# Patient Record
Sex: FEMALE | Race: WHITE | NOT HISPANIC OR LATINO
[De-identification: names, ages, dates, MRNs, and addresses within clinical notes are randomized per-mention and may not be internally consistent; named-entity substitution may affect disease eponyms.]

---

## 2019-03-08 ENCOUNTER — TRANSCRIPTION ENCOUNTER (OUTPATIENT)
Age: 19
End: 2019-03-08

## 2020-11-18 PROBLEM — Z00.00 ENCOUNTER FOR PREVENTIVE HEALTH EXAMINATION: Status: ACTIVE | Noted: 2020-11-18

## 2020-12-01 ENCOUNTER — FORM ENCOUNTER (OUTPATIENT)
Age: 20
End: 2020-12-01

## 2020-12-02 ENCOUNTER — APPOINTMENT (OUTPATIENT)
Dept: ELECTROPHYSIOLOGY | Facility: CLINIC | Age: 20
End: 2020-12-02
Payer: COMMERCIAL

## 2020-12-02 ENCOUNTER — TRANSCRIPTION ENCOUNTER (OUTPATIENT)
Age: 20
End: 2020-12-02

## 2020-12-02 ENCOUNTER — NON-APPOINTMENT (OUTPATIENT)
Age: 20
End: 2020-12-02

## 2020-12-02 VITALS
HEIGHT: 63 IN | OXYGEN SATURATION: 98 % | SYSTOLIC BLOOD PRESSURE: 126 MMHG | WEIGHT: 157 LBS | BODY MASS INDEX: 27.82 KG/M2 | DIASTOLIC BLOOD PRESSURE: 88 MMHG | HEART RATE: 108 BPM

## 2020-12-02 DIAGNOSIS — R00.0 TACHYCARDIA, UNSPECIFIED: ICD-10-CM

## 2020-12-02 DIAGNOSIS — R00.2 PALPITATIONS: ICD-10-CM

## 2020-12-02 DIAGNOSIS — G43.909 MIGRAINE, UNSPECIFIED, NOT INTRACTABLE, W/OUT STATUS MIGRAINOSUS: ICD-10-CM

## 2020-12-02 DIAGNOSIS — Z78.9 OTHER SPECIFIED HEALTH STATUS: ICD-10-CM

## 2020-12-02 DIAGNOSIS — Z83.3 FAMILY HISTORY OF DIABETES MELLITUS: ICD-10-CM

## 2020-12-02 DIAGNOSIS — R06.00 DYSPNEA, UNSPECIFIED: ICD-10-CM

## 2020-12-02 PROCEDURE — 0296T: CPT

## 2020-12-02 PROCEDURE — 93000 ELECTROCARDIOGRAM COMPLETE: CPT

## 2020-12-02 PROCEDURE — 99072 ADDL SUPL MATRL&STAF TM PHE: CPT

## 2020-12-02 PROCEDURE — 99204 OFFICE O/P NEW MOD 45 MIN: CPT

## 2020-12-02 RX ORDER — UBROGEPANT 100 MG/1
100 TABLET ORAL
Refills: 0 | Status: ACTIVE | COMMUNITY

## 2020-12-03 NOTE — PHYSICAL EXAM
[General Appearance - Well Developed] : well developed [General Appearance - Well Nourished] : well nourished [Normal Conjunctiva] : the conjunctiva exhibited no abnormalities [Normal Oropharynx] : normal oropharynx [Normal Jugular Venous V Waves Present] : normal jugular venous V waves present [Heart Sounds] : normal S1 and S2 [Respiration, Rhythm And Depth] : normal respiratory rhythm and effort [Abnormal Walk] : normal gait [Cyanosis, Localized] : no localized cyanosis [] : no rash [Bowel Sounds] : normal bowel sounds [Oriented To Time, Place, And Person] : oriented to person, place, and time

## 2020-12-04 NOTE — END OF VISIT
[FreeTextEntry3] : Agree with above NP note. 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 Pre-excitation pattern would suggest and rosita- or mid-septal, right sided pathway. Morphologically there is also the possibility of an atrio-fascicular pathway (Mahaim) though the inferiorly directed ventricular axis is inconsistent with this finding. We discussed the role of accessory pathways in both reentrant arrhythmias and their association with an increased (though small) risk of both atrial fibrillation and sudden death. Further we discussed EP study and possible ablation as a means of further risk stratification and management. The rationale for the procedure as well as its risks--including but not limited to bleeding, vascular injury, pericardial effusion/tamponade, heart block requiring pacemaker, stroke, and death--were reviewed with particular attention given to the risk of heart block given the likely proximity of this AP to her native AV conduction. After consideration of this information, the decision was made to proceed with the procedure. \par \par She is already scheduled for an ETT in your office; today she was given an event monitor for further clarification as to cause of her palpitations in anticipation of EP study.\par

## 2020-12-04 NOTE — HISTORY OF PRESENT ILLNESS
[FreeTextEntry1] : Referring Physician: Dr. Cordell Kc\par \par Dear Dr. Kc, \par \par Ms. Beal was seen in the [facility] today. For our records, please allow me to summarize the history and my findings.\par \par This pleasant 20 year old woman has a medical history for migraines, innocent heart murmur, and many years of mild chest tightness and palpitations. She was at her PCP office for a routine physical exam and had her first ECG, which reportedly revealed LBBB. She was then sent to your office and underwent EKG and ECHO. EKG confirmed the presence of an accessory pathway and ECHO revealed a normal structural heart with EF 55%. She presents to our office today for further evaluation. \par \par Today, she reports feeling generally well. She endorses mild sensation of palpations/chest tightness that can last minutes to hours and are relieved by lying in recumbent position. She will occasionally check her HR on an Apple watch and reports HR is usually 100-110bpm. For many years she attributed symptoms to anxiety. No identifiable triggers, symptoms occur both at rest and with activity. No exercise intolerance or limitations in daily activities. No lightheadedness, dizziness, shortness of breath, or syncope. \par  \par Her mother had palpations many years ago which resolved on their own. No family history of SCD, syncope or sudden loss of consciousness.  \par \par \par

## 2020-12-04 NOTE — ASSESSMENT
[FreeTextEntry1] : In summary, Ms. Beal is a 20 year old female with pre-excitation on EKG and symptoms of recurrent palpitations and chest tightness. We discussed diagnosis of Jes-Parkinson-White syndrome, including risks of SVT and SCD. We reviewed at length the risks and benefits of risk stratification with EPS and of ablation. The procedure, outcome, and risks of ablation were discussed in detail. The risks discussed included but not limited to bleeding, perforation, tamponade, heart block and MI.  After all questions answered the patient opted for EPS and ablation. \par \par She will wear a two week ziopatch monitor and was asked to trigger the monitor when feeling symptomatic to help better localize accessory pathway. We will review results of stress test from your office before she undergoes procedure. \par \par Thank you for allowing me to be involved in the care of this pleasant woman. Please feel free to contact me with any questions.  \par

## 2020-12-30 ENCOUNTER — NON-APPOINTMENT (OUTPATIENT)
Age: 20
End: 2020-12-30

## 2021-01-01 DIAGNOSIS — Z01.818 ENCOUNTER FOR OTHER PREPROCEDURAL EXAMINATION: ICD-10-CM

## 2021-01-02 ENCOUNTER — APPOINTMENT (OUTPATIENT)
Dept: DISASTER EMERGENCY | Facility: CLINIC | Age: 21
End: 2021-01-02

## 2021-01-03 LAB — SARS-COV-2 N GENE NPH QL NAA+PROBE: NOT DETECTED

## 2021-01-05 ENCOUNTER — OUTPATIENT (OUTPATIENT)
Dept: INPATIENT UNIT | Facility: HOSPITAL | Age: 21
LOS: 1 days | End: 2021-01-05
Payer: COMMERCIAL

## 2021-01-05 ENCOUNTER — TRANSCRIPTION ENCOUNTER (OUTPATIENT)
Age: 21
End: 2021-01-05

## 2021-01-05 VITALS
WEIGHT: 156.97 LBS | HEIGHT: 63 IN | OXYGEN SATURATION: 99 % | SYSTOLIC BLOOD PRESSURE: 132 MMHG | TEMPERATURE: 98 F | DIASTOLIC BLOOD PRESSURE: 74 MMHG | HEART RATE: 92 BPM | RESPIRATION RATE: 18 BRPM

## 2021-01-05 DIAGNOSIS — R00.0 TACHYCARDIA, UNSPECIFIED: ICD-10-CM

## 2021-01-05 LAB
ANION GAP SERPL CALC-SCNC: 13 MMOL/L — SIGNIFICANT CHANGE UP (ref 5–17)
APTT BLD: 30.9 SEC — SIGNIFICANT CHANGE UP (ref 27.5–35.5)
BLD GP AB SCN SERPL QL: NEGATIVE — SIGNIFICANT CHANGE UP
BUN SERPL-MCNC: 11 MG/DL — SIGNIFICANT CHANGE UP (ref 7–23)
CALCIUM SERPL-MCNC: 9.6 MG/DL — SIGNIFICANT CHANGE UP (ref 8.4–10.5)
CHLORIDE SERPL-SCNC: 103 MMOL/L — SIGNIFICANT CHANGE UP (ref 96–108)
CO2 SERPL-SCNC: 21 MMOL/L — LOW (ref 22–31)
CREAT SERPL-MCNC: 0.75 MG/DL — SIGNIFICANT CHANGE UP (ref 0.5–1.3)
GLUCOSE SERPL-MCNC: 94 MG/DL — SIGNIFICANT CHANGE UP (ref 70–99)
HCG SERPL-ACNC: <2 MIU/ML — SIGNIFICANT CHANGE UP
HCT VFR BLD CALC: 39.6 % — SIGNIFICANT CHANGE UP (ref 34.5–45)
HGB BLD-MCNC: 13.6 G/DL — SIGNIFICANT CHANGE UP (ref 11.5–15.5)
INR BLD: 0.96 RATIO — SIGNIFICANT CHANGE UP (ref 0.88–1.16)
MCHC RBC-ENTMCNC: 29.2 PG — SIGNIFICANT CHANGE UP (ref 27–34)
MCHC RBC-ENTMCNC: 34.3 GM/DL — SIGNIFICANT CHANGE UP (ref 32–36)
MCV RBC AUTO: 85 FL — SIGNIFICANT CHANGE UP (ref 80–100)
NRBC # BLD: 0 /100 WBCS — SIGNIFICANT CHANGE UP (ref 0–0)
PLATELET # BLD AUTO: 184 K/UL — SIGNIFICANT CHANGE UP (ref 150–400)
POTASSIUM SERPL-MCNC: 4.1 MMOL/L — SIGNIFICANT CHANGE UP (ref 3.5–5.3)
POTASSIUM SERPL-SCNC: 4.1 MMOL/L — SIGNIFICANT CHANGE UP (ref 3.5–5.3)
PROTHROM AB SERPL-ACNC: 11.5 SEC — SIGNIFICANT CHANGE UP (ref 10.6–13.6)
RBC # BLD: 4.66 M/UL — SIGNIFICANT CHANGE UP (ref 3.8–5.2)
RBC # FLD: 12 % — SIGNIFICANT CHANGE UP (ref 10.3–14.5)
RH IG SCN BLD-IMP: POSITIVE — SIGNIFICANT CHANGE UP
RH IG SCN BLD-IMP: POSITIVE — SIGNIFICANT CHANGE UP
SODIUM SERPL-SCNC: 137 MMOL/L — SIGNIFICANT CHANGE UP (ref 135–145)
WBC # BLD: 6.75 K/UL — SIGNIFICANT CHANGE UP (ref 3.8–10.5)
WBC # FLD AUTO: 6.75 K/UL — SIGNIFICANT CHANGE UP (ref 3.8–10.5)

## 2021-01-05 RX ORDER — ACETAMINOPHEN 500 MG
650 TABLET ORAL EVERY 6 HOURS
Refills: 0 | Status: DISCONTINUED | OUTPATIENT
Start: 2021-01-05 | End: 2021-01-06

## 2021-01-05 RX ORDER — FENTANYL CITRATE 50 UG/ML
25 INJECTION INTRAVENOUS ONCE
Refills: 0 | Status: DISCONTINUED | OUTPATIENT
Start: 2021-01-05 | End: 2021-01-05

## 2021-01-05 RX ORDER — ASPIRIN/CALCIUM CARB/MAGNESIUM 324 MG
81 TABLET ORAL DAILY
Refills: 0 | Status: DISCONTINUED | OUTPATIENT
Start: 2021-01-05 | End: 2021-01-06

## 2021-01-05 RX ORDER — OXYCODONE AND ACETAMINOPHEN 5; 325 MG/1; MG/1
1 TABLET ORAL EVERY 4 HOURS
Refills: 0 | Status: DISCONTINUED | OUTPATIENT
Start: 2021-01-05 | End: 2021-01-06

## 2021-01-05 NOTE — CHART NOTE - NSCHARTNOTEFT_GEN_A_CORE
Pre-op Diagnosis:  WPW    Post-op Diagnosis:  same    Procedure:  EPS/Ablation of anteroseptal accessory pathway     Electrophysiologist:  Dr. Mcginnis    Assistant:  Dr. Aguilar    Anesthesia:  MAC and local anesthesia     Access:  - Rt. Femoral vein  - Rt. Femoral artery  - Lt. femoral vein    Description:  - The patient arrived to the electrophysiology laboratory in a fasting state.  Informed consent was obtained.  Continuous electrocardiographic and hemodynamic monitoring was initiated.  The patient was prepped and draped in standard fashion.   - Using modified Seldinger technique, 8, 7F sheaths were placed in the right femoral vein and 5F, 6F x2 catheters were placed in the left femoral vein. A decapolar catheter was advanced into the coronary sinus, and quadripolar catheters positioned in the High RA (HRA), RV apex, His bundle.   - Programmed stimulation was performed from the HRA, coronary sinus and right ventricle. The patient showed manifest pre-excitation at baseline, we could not discern a His potential on the His catheter. There was no retrograde conduction though the accessory pathway with pacing from the RV apex. Retrograde conduction was always decremental and concentric and most likely yared. Parahisian pacing attempted and failed to show any evidence of retrogradely conducting pathway as well. Pacing from the High RA resulted in maximal pre-excitation and the ERP of the accessory pathway was achieved with S1-S2 500-300. AVNERP was seen at 500/250 msec. When the ERP of the accessory pathway would be achieved we were able to see a clear His potential on the His catheter with long AH interval. There was also what seemed to be AH jump suggestive of dual AV yared physiology. We were unable to induce SVT with programmed stimulation or extra-stimuli. Isuprel was infused and we noticed the block CL of the accessory pathway to be about 240 ms which rendered this pathway to be a high risk pathway. Throughout the study the earliest activation was on CS 9,10. VA conduction was present and midline. VA ERP observed at 580/470 msec.   - Mapping catheter was ([employer]) was used to create a right atrial geometry and to define the AV annulus. Earliest ventricular signla (pre-Delta with 30-35 ms) was mapped to be in the anterosuperior region superior to the His cloud. Also Alexander potentials were seen at a site around 12 o'clock on the tricuspid annulus and Ablation lesions were delivered there with temporary suppression of the pathway. It was difficult to achieve good contact and deliver good lesions in that area despite multiple attempts. Then we decided to map and consider ablation from the NCC.  - Arterial access with US and Fluoroscopic guidance was achieved in the Rt. common femoral artery. 8 Fr. sheath was used. The Carto Smart Touch ablation catheter was advanced retrogradely and used to create an anatomical map of the LVOT. Signals recorded form the NCC with large A/Small V had what seems to be Alexander potentials similar to those observed from the tricuspid annulus. Ablation at this site resulted in termination of accessory pathway conduction within few seconds of initiating energy delivery. A second ablation lesion was applied in this area.   - After observation of 45 minutes there was no recurrence of accessory pathway conduction. Further attempts of atrial and ventricular pacing were made during the waiting period and no evidence of accessory pathway/ORT/AVNRT was seen.    - Arterial and Venous sheaths were pulled and manual pressure applied for 30 minutes till hemostasis achieved.  - The patient was transferred to the holding area in stable condition.      Complications:  none    EBL:  5 cc    Disposition:  CRS then back to [facility]    Plan:  - monitor b/l groins for bleeding/hematoma  - will start ASA 81 daily for 1 month   - call EP with any Qs.

## 2021-01-05 NOTE — CHART NOTE - NSCHARTNOTEFT_GEN_A_CORE
HPI:  20 year old female, COVID negative 1/3/20, no implantable devices with PMHx of migraines, several years of palpitations and WATT, presented to her PCP office for a routine physical exam along with an ECG which revealed a LBBB. She was then sent to see Dr. Mcginnis with EKG revealing WPW, pre-excitation pattern suggesting an rosita or mid-septal right sided pathway with a possibility of an atriofascicular pathway and echo revealing an EF of 55%. She is now here for a scheduled EP study for possible induction of arrythmia and possible SVT ablation. She currently feels palpitations and slight shortness of breath at rest, which she reports has been getting worse the last two weeks. She denies chest pain, dizziness, syncope, recent weight gain, N/V, sick contacts, fever or recent travel. (05 Jan 2021 07:23)      s/p EP study WPW ablation via RFV/RFA and LFV with no sutures in place, gauze and tegaderm in place with no bleeding, hematoma and site soft to palpation.   Bedrest until 2100   Post procedure ECG NSR @ 64  Admit to tele overnight

## 2021-01-05 NOTE — H&P CARDIOLOGY - HISTORY OF PRESENT ILLNESS
20 year old female, COVID negative 1/3/20, no implantable devices with PMHx of migraines, several years of palpitations and WATT, presented to her PCP office for a routine physical exam along with an ECG which revealed a LBBB. She was then sent to see Dr. Mcginnis with EKG revealing WPW, pre-excitation pattern suggesting an rosita or mid-septal right sided pathway with a possibility of an atriofascicular pathway and echo revealing an EF of 55%. She is now here for a scheduled EP study for possible induction of arrythmia and possible SVT ablation. She currently feels palpitations and slight shortness of breath at rest, which she reports has been getting worse the last two weeks. She denies chest pain, dizziness, syncope, recent weight gain, N/V, sick contacts, fever or recent travel.

## 2021-01-05 NOTE — CHART NOTE - NSCHARTNOTEFT_GEN_A_CORE
Patient bladder noted to be distended and tender to touch and patient c/o pain.     Straight catheterized patient with >1L output  called over to bedside by nurse for bleeding noted to right groin with small hematoma and c/o pain.   Pressure applied by MD Aguilar to right groin  Fentanyl 25mcg x1 given for pain Patient bladder noted to be distended and tender to touch and patient c/o pain.     Straight catheterized patient with >1L output  called over to bedside by nurse for bleeding noted to right groin with small hematoma and c/o pain, immediate pressure applied.   Pressure applied by MD Aguilar to right groin for 20 min with resolution of hematoma. Site soft, nontender to touch and no s/s of bleeding and hematoma, patient denies pain.

## 2021-01-06 VITALS
SYSTOLIC BLOOD PRESSURE: 135 MMHG | RESPIRATION RATE: 16 BRPM | HEART RATE: 112 BPM | DIASTOLIC BLOOD PRESSURE: 78 MMHG | TEMPERATURE: 99 F | OXYGEN SATURATION: 99 %

## 2021-01-06 LAB
ALBUMIN SERPL ELPH-MCNC: 3.7 G/DL — SIGNIFICANT CHANGE UP (ref 3.3–5)
ALP SERPL-CCNC: 58 U/L — SIGNIFICANT CHANGE UP (ref 40–120)
ALT FLD-CCNC: 13 U/L — SIGNIFICANT CHANGE UP (ref 10–45)
ANION GAP SERPL CALC-SCNC: 17 MMOL/L — SIGNIFICANT CHANGE UP (ref 5–17)
AST SERPL-CCNC: 24 U/L — SIGNIFICANT CHANGE UP (ref 10–40)
BILIRUB SERPL-MCNC: 0.2 MG/DL — SIGNIFICANT CHANGE UP (ref 0.2–1.2)
BUN SERPL-MCNC: 6 MG/DL — LOW (ref 7–23)
CALCIUM SERPL-MCNC: 8.9 MG/DL — SIGNIFICANT CHANGE UP (ref 8.4–10.5)
CHLORIDE SERPL-SCNC: 105 MMOL/L — SIGNIFICANT CHANGE UP (ref 96–108)
CO2 SERPL-SCNC: 16 MMOL/L — LOW (ref 22–31)
CREAT SERPL-MCNC: 0.63 MG/DL — SIGNIFICANT CHANGE UP (ref 0.5–1.3)
GLUCOSE SERPL-MCNC: 120 MG/DL — HIGH (ref 70–99)
HCT VFR BLD CALC: 36.6 % — SIGNIFICANT CHANGE UP (ref 34.5–45)
HGB BLD-MCNC: 12.2 G/DL — SIGNIFICANT CHANGE UP (ref 11.5–15.5)
MCHC RBC-ENTMCNC: 29.4 PG — SIGNIFICANT CHANGE UP (ref 27–34)
MCHC RBC-ENTMCNC: 33.3 GM/DL — SIGNIFICANT CHANGE UP (ref 32–36)
MCV RBC AUTO: 88.2 FL — SIGNIFICANT CHANGE UP (ref 80–100)
NRBC # BLD: 0 /100 WBCS — SIGNIFICANT CHANGE UP (ref 0–0)
PLATELET # BLD AUTO: 189 K/UL — SIGNIFICANT CHANGE UP (ref 150–400)
POTASSIUM SERPL-MCNC: 4 MMOL/L — SIGNIFICANT CHANGE UP (ref 3.5–5.3)
POTASSIUM SERPL-SCNC: 4 MMOL/L — SIGNIFICANT CHANGE UP (ref 3.5–5.3)
PROT SERPL-MCNC: 6 G/DL — SIGNIFICANT CHANGE UP (ref 6–8.3)
RBC # BLD: 4.15 M/UL — SIGNIFICANT CHANGE UP (ref 3.8–5.2)
RBC # FLD: 11.9 % — SIGNIFICANT CHANGE UP (ref 10.3–14.5)
SODIUM SERPL-SCNC: 138 MMOL/L — SIGNIFICANT CHANGE UP (ref 135–145)
WBC # BLD: 10.22 K/UL — SIGNIFICANT CHANGE UP (ref 3.8–10.5)
WBC # FLD AUTO: 10.22 K/UL — SIGNIFICANT CHANGE UP (ref 3.8–10.5)

## 2021-01-06 PROCEDURE — 85027 COMPLETE CBC AUTOMATED: CPT

## 2021-01-06 PROCEDURE — C1730: CPT

## 2021-01-06 PROCEDURE — 93010 ELECTROCARDIOGRAM REPORT: CPT

## 2021-01-06 PROCEDURE — 86901 BLOOD TYPING SEROLOGIC RH(D): CPT

## 2021-01-06 PROCEDURE — 85730 THROMBOPLASTIN TIME PARTIAL: CPT

## 2021-01-06 PROCEDURE — 93613 INTRACARDIAC EPHYS 3D MAPG: CPT

## 2021-01-06 PROCEDURE — 85610 PROTHROMBIN TIME: CPT

## 2021-01-06 PROCEDURE — 80048 BASIC METABOLIC PNL TOTAL CA: CPT

## 2021-01-06 PROCEDURE — 93005 ELECTROCARDIOGRAM TRACING: CPT

## 2021-01-06 PROCEDURE — 84702 CHORIONIC GONADOTROPIN TEST: CPT

## 2021-01-06 PROCEDURE — 80053 COMPREHEN METABOLIC PANEL: CPT

## 2021-01-06 PROCEDURE — 93653 COMPRE EP EVAL TX SVT: CPT

## 2021-01-06 PROCEDURE — C1732: CPT

## 2021-01-06 PROCEDURE — C1766: CPT

## 2021-01-06 PROCEDURE — 93623 PRGRMD STIMJ&PACG IV RX NFS: CPT

## 2021-01-06 PROCEDURE — 86850 RBC ANTIBODY SCREEN: CPT

## 2021-01-06 PROCEDURE — 86900 BLOOD TYPING SEROLOGIC ABO: CPT

## 2021-01-06 RX ORDER — ACETAMINOPHEN 500 MG
2 TABLET ORAL
Qty: 0 | Refills: 0 | DISCHARGE
Start: 2021-01-06

## 2021-01-06 RX ORDER — ACETAMINOPHEN 500 MG
650 TABLET ORAL EVERY 6 HOURS
Refills: 0 | Status: DISCONTINUED | OUTPATIENT
Start: 2021-01-06 | End: 2021-01-06

## 2021-01-06 RX ORDER — ASPIRIN/CALCIUM CARB/MAGNESIUM 324 MG
1 TABLET ORAL
Qty: 0 | Refills: 0 | DISCHARGE
Start: 2021-01-06

## 2021-01-06 RX ADMIN — Medication 81 MILLIGRAM(S): at 06:01

## 2021-01-06 RX ADMIN — Medication 650 MILLIGRAM(S): at 06:01

## 2021-01-06 NOTE — DISCHARGE NOTE PROVIDER - NSDCCPCAREPLAN_GEN_ALL_CORE_FT
PRINCIPAL DISCHARGE DIAGNOSIS  Diagnosis: Jes-Parkinson-White syndrome  Assessment and Plan of Treatment: Continue with your cardiologist and primary care MD. Continue your current medications. Call your physician for palpitations, feelings of rapid heart beat, lightheadedness, or dizziness. Monitor your left and right groin sites for swelling, bleeding.

## 2021-01-06 NOTE — DISCHARGE NOTE PROVIDER - NSDCCPTREATMENT_GEN_ALL_CORE_FT
PRINCIPAL PROCEDURE  Procedure: Electrophysiology study with ablation of focus of Jes-Parkinson-White syndrome  Findings and Treatment: WPW ablation

## 2021-01-06 NOTE — DISCHARGE NOTE PROVIDER - NSDCMRMEDTOKEN_GEN_ALL_CORE_FT
acetaminophen 325 mg oral tablet: 2 tab(s) orally every 6 hours, As needed, Mild Pain (1 - 3)  aspirin 81 mg oral delayed release tablet: 1 tab(s) orally once a day  Sprintec 0.25 mg-35 mcg oral tablet: 1 tab(s) orally once a day  Ubrelvy 100 mg oral tablet: 1 tab(s) orally once, As Needed   acetaminophen 325 mg oral tablet: 2 tab(s) orally every 6 hours, As needed, Mild Pain (1 - 3) Moderate (4 - 6)  acetaminophen 500 mg oral tablet: 2 tab(s) orally every 6 hours - Severe pain (8 - 10)  aspirin 81 mg oral delayed release tablet: 1 tab(s) orally once a day  Sprintec 0.25 mg-35 mcg oral tablet: 1 tab(s) orally once a day  Ubrelvy 100 mg oral tablet: 1 tab(s) orally once, As Needed

## 2021-01-06 NOTE — DISCHARGE NOTE PROVIDER - HOSPITAL COURSE
HPI:  20 year old female, COVID negative 1/3/20, no implantable devices with PMHx of migraines, several years of palpitations and WATT, presented to her PCP office for a routine physical exam along with an ECG which revealed a LBBB. She was then sent to see Dr. Mcginnis with EKG revealing WPW, pre-excitation pattern suggesting an rosita or mid-septal right sided pathway with a possibility of an atriofascicular pathway and echo revealing an EF of 55%. She is now here for a scheduled EP study for possible induction of arrythmia and possible SVT ablation. She currently feels palpitations and slight shortness of breath at rest, which she reports has been getting worse the last two weeks. She denies chest pain, dizziness, syncope, recent weight gain, N/V, sick contacts, fever or recent travel. (05 Jan 2021 07:23).    1/5 s/p WPW ablation. B/L groin sites without swelling, bleeding.

## 2021-01-06 NOTE — DISCHARGE NOTE PROVIDER - CARE PROVIDER_API CALL
Prashant Mcginnis  CARDIAC ELECTROPHYSIOLOGY  [address] [place], [place] [address]  Phone: [phone]  Fax: ()-  Follow Up Time:    Prashant Mcginnis  CARDIAC ELECTROPHYSIOLOGY  [address] [place], [place] [address]  Phone: [phone]  Fax: ()-  Scheduled Appointment: 02/17/2021 08:20 AM

## 2021-01-06 NOTE — CHART NOTE - NSCHARTNOTEFT_GEN_A_CORE
HPI:  20 year old female, COVID negative 1/3/20, no implantable devices with PMHx of migraines, several years of palpitations and WATT, presented to her PCP office for a routine physical exam along with an ECG which revealed a LBBB. She was then sent to see Dr. Mcginnis with EKG revealing WPW, pre-excitation pattern suggesting an rosita or mid-septal right sided pathway with a possibility of an atriofascicular pathway and echo revealing an EF of 55%. She is now here for a scheduled EP study for possible induction of arrythmia and possible SVT ablation. She currently feels palpitations and slight shortness of breath at rest, which she reports has been getting worse the last two weeks. She denies chest pain, dizziness, syncope, recent weight gain, N/V, sick contacts, fever or recent travel. (05 Jan 2021 07:23)  1/5- Patient s/p WPW ablation    Right groin site stable, ecchymosis, slightly tender upon palpation, palpable pulses  Ambulated without signs of hematoma, bleeding, discomfort managed well with Tylenol  ECG and tele reviewed with Dr. Brantley EP fellow  ST on monitor, not experiencing any associated symptoms, resting comfortably  Discussed with EP team, patient stable for discharge home and reviewed discharge instructions, verbalized understanding HPI:  20 year old female, COVID negative 1/3/20, no implantable devices with PMHx of migraines, several years of palpitations and WATT, presented to her PCP office for a routine physical exam along with an ECG which revealed a LBBB. She was then sent to see Dr. Mcginnis with EKG revealing WPW, pre-excitation pattern suggesting an rosita or mid-septal right sided pathway with a possibility of an atriofascicular pathway and echo revealing an EF of 55%. She is now here for a scheduled EP study for possible induction of arrythmia and possible SVT ablation. She currently feels palpitations and slight shortness of breath at rest, which she reports has been getting worse the last two weeks. She denies chest pain, dizziness, syncope, recent weight gain, N/V, sick contacts, fever or recent travel. (05 Jan 2021 07:23)  1/5- Patient s/p WPW ablation    Right groin site stable, ecchymosis, slightly tender upon palpation, palpable pulses  Ambulated without signs of hematoma, bleeding, discomfort managed well with Tylenol  Patient voided this morning without issue s/p mancilla removal  ECG and tele reviewed with Dr. Brantley EP fellow  ST on monitor, not experiencing any associated symptoms, resting comfortably  Discussed with EP team, patient stable for discharge home and reviewed discharge instructions, verbalized understanding

## 2021-01-06 NOTE — DISCHARGE NOTE PROVIDER - NSDCFUADDINST_GEN_ALL_CORE_FT
WOUND CARE:  The day AFTER your procedure  - Remove the bandage at the site GENTLY, clean with mild soap and water, and pat dry; leave open to air  - You may shower   - DO NOT apply lotions, creams, ointments, powder, perfumes to your incision site  -Check your groin every day. A small amount of bruising or soreness is normal, a bump ( smaller than nickel) might be present, normal  - DO NOT SOAK your site for 1 week ( no baths, no pools, no tubs, etc..)    ACTIVITY:  Your procedure was done through your groin  for the next 5 DAYS:  - Limit climbing stairs, no strenuous activity, pushing , pulling, or straining   DO NOT LIFT anything 10 lbs or heavier   you may resume sexual activity in 7 days, unless instructed otherwise  mild palpitations are normal     Follow heart healthy diet recommended by your doctor, , if you smoke STOP SMOKING ( may call [phone] for center of tobacco control if you need assistance).  for the next 24 hours:   - stay at home and rest, do not drive or operate heavy machinery   do not drink alcoholic beverages   do not make important personal or business decisions     ***CALL YOUR DOCTOR ***  IF you have fever, chills, body aches, or severe pain, swelling, redness, heat, yellow drainage from your incision site  IF bleeding  or significant new swelling from your puncture site  IF you experience rapid heartbeat or palpitations that cause: lightheadedness, dizziness, or fainting spell.  If you experience difficulty swallowing, or pain with swallowing   IF unable to ge tin contact with your doctor, you may call the Cardiology Office at [facility] at [phone]

## 2021-01-06 NOTE — DISCHARGE NOTE PROVIDER - NSDCPNSUBOBJ_GEN_ALL_CORE
Patient is a 20y old  Female who presents with a chief complaint of WPW Syndrome (2021 01:33)          Allergies    No Known Allergies    Intolerances        Medications:  acetaminophen   Tablet .. 650 milliGRAM(s) Oral every 6 hours PRN  aspirin enteric coated 81 milliGRAM(s) Oral daily  oxycodone    5 mG/acetaminophen 325 mG 1 Tablet(s) Oral every 4 hours PRN      Vitals:  T(C): 36.7 (21 @ 19:00), Max: 36.9 (21 @ 06:58)  HR: 102 (21 @ 22:30) (68 - 102)  BP: 114/70 (21 @ 22:30) (91/50 - 132/74)  BP(mean): 93 (21 @ 07:23) (93 - 93)  RR: 17 (21 @ 22:30) (16 - 18)  SpO2: 100% (21 @ 22:30) (95% - 100%)  Wt(kg): --  Daily Height in cm: 160.02 (2021 08:13)    Daily Weight in k.2 (2021 07:23)  I&O's Summary    2021 07:01  -  2021 01:47  --------------------------------------------------------  IN: 0 mL / OUT: 1600 mL / NET: -1600 mL          Physical Exam:  Appearance: Normal  Eyes: PERRL, EOMI  HENT: Normal oral mus[place]a, NC/AT  Cardiovascular: S1S2, RRR, No M/R/G, no JVD, No Lower extremity edema  Procedural Access Site: No hematoma, Non-tender to palpation, 2+ pulse, No bruit, No Ecchymosis  Respiratory: Clear to auscultation bilaterally  Gastrointestinal: Soft, Non tender, Normal Bowel Sounds  Musculoskeletal: No clubbing, No joint deformity   Neurologic: Non-focal  Lymphatic: No lymphadenopathy  Psychiatry: AAOx3, Mood & affect appropriate  Skin: No rashes, No ecchymoses, No cyanosis    01-05    137  |  103  |  11  ----------------------------<  94  4.1   |  21<L>  |  0.75    Ca    9.6      2021 07:17      PT/INR - ( 2021 07:17 )   PT: 11.5 sec;   INR: 0.96 ratio         PTT - ( 2021 07:17 )  PTT:30.9 sec        Lipid panel   Hgb A1c                         13.6   6.75  )-----------( 184      ( 2021 07:17 )             39.6         ECG:  bpm    Electrophysiology: WPW ablation    Interpretation of Telemetry:

## 2021-01-06 NOTE — DISCHARGE NOTE PROVIDER - PROVIDER TOKENS
PROVIDER:[TOKEN:[[id number]]] PROVIDER:[TOKEN:[[id number]],SCHEDULEDAPPT:[02/17/2021],SCHEDULEDAPPTTIME:[08:20 AM]]

## 2021-01-07 PROCEDURE — 93248 EXT ECG>7D<15D REV&INTERPJ: CPT

## 2021-01-07 PROCEDURE — 99072 ADDL SUPL MATRL&STAF TM PHE: CPT

## 2021-01-07 RX ORDER — NORGESTIMATE AND ETHINYL ESTRADIOL 7DAYSX3 LO
1 KIT ORAL
Qty: 0 | Refills: 0 | DISCHARGE

## 2021-01-07 RX ORDER — ACETAMINOPHEN 500 MG
2 TABLET ORAL
Qty: 0 | Refills: 0 | DISCHARGE

## 2021-01-07 RX ORDER — UBROGEPANT 100 MG/1
1 TABLET ORAL
Qty: 0 | Refills: 0 | DISCHARGE

## 2021-02-16 NOTE — PHYSICAL EXAM
[General Appearance - Well Developed] : well developed [General Appearance - Well Nourished] : well nourished [Normal Conjunctiva] : the conjunctiva exhibited no abnormalities [Normal Oropharynx] : normal oropharynx [Normal Jugular Venous V Waves Present] : normal jugular venous V waves present [Respiration, Rhythm And Depth] : normal respiratory rhythm and effort [Heart Sounds] : normal S1 and S2 [Bowel Sounds] : normal bowel sounds [Abnormal Walk] : normal gait [Cyanosis, Localized] : no localized cyanosis [] : no rash [Oriented To Time, Place, And Person] : oriented to person, place, and time

## 2021-02-17 ENCOUNTER — NON-APPOINTMENT (OUTPATIENT)
Age: 21
End: 2021-02-17

## 2021-02-17 ENCOUNTER — APPOINTMENT (OUTPATIENT)
Dept: ELECTROPHYSIOLOGY | Facility: CLINIC | Age: 21
End: 2021-02-17
Payer: COMMERCIAL

## 2021-02-17 VITALS — HEART RATE: 74 BPM | SYSTOLIC BLOOD PRESSURE: 130 MMHG | DIASTOLIC BLOOD PRESSURE: 85 MMHG

## 2021-02-17 PROBLEM — G43.909 MIGRAINE, UNSPECIFIED, NOT INTRACTABLE, WITHOUT STATUS MIGRAINOSUS: Chronic | Status: ACTIVE | Noted: 2021-01-05

## 2021-02-17 PROBLEM — R00.2 PALPITATIONS: Chronic | Status: ACTIVE | Noted: 2021-01-05

## 2021-02-17 PROCEDURE — 99214 OFFICE O/P EST MOD 30 MIN: CPT

## 2021-02-17 PROCEDURE — 99072 ADDL SUPL MATRL&STAF TM PHE: CPT

## 2021-02-17 PROCEDURE — 93242 EXT ECG>48HR<7D RECORDING: CPT

## 2021-02-17 PROCEDURE — 93000 ELECTROCARDIOGRAM COMPLETE: CPT | Mod: 59

## 2021-02-17 RX ORDER — NORGESTIMATE AND ETHINYL ESTRADIOL 0.25-0.035
0.25-35 KIT ORAL
Refills: 0 | Status: ACTIVE | COMMUNITY

## 2021-02-17 NOTE — ASSESSMENT
[FreeTextEntry1] : In summary, Ms. 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 We are pleased to see she is doing well with resolution of her accessory pathway. Given her ongoing mild palpitations, she will be given a three-day event monitor for further evaluation and was advised to trigger the device when symptomatic. \par \par We will discuss results via telephone. She will follow up in your office moving forward. \par \par Thank you for allowing me to be involved in the care of this pleasant woman. Please feel free to contact me with any questions.  \par

## 2021-02-17 NOTE — HISTORY OF PRESENT ILLNESS
[FreeTextEntry1] : Referring Physician: Dr. Cordell Kc\par \par Dear Dr. Kc, \par \par Ms. Beal was seen in the [facility] today. For our records, please allow me to summarize the history and my findings.\par \par This pleasant 20 year old woman has a medical history for migraines, innocent heart murmur, and many years of mild chest tightness and palpitations. She was at her PCP office for a routine physical exam and had her first ECG, which reportedly revealed LBBB. She was then sent to your office and underwent EKG and ECHO. EKG confirmed the presence of WPW and ECHO revealed a normal structural heart with EF 55%. \par \par On 1/5/2021 she underwent a successful anteroseptal pathway ablation from non-coronary cusp and presents today for follow up. She endorses palpitations immediately post -operatively, which improved after  a few days, and has returned over the past two weeks. She has been experiencing palpitations for many years and reports now they are still present, but significantly more mild and not bothersome at all. She no longer feels her heart racing. No groin pain, fever, or trouble swallowing\par \par EKG today revealed NSR @85bpm without an accessory pathway. \par \par

## 2021-03-01 PROCEDURE — 93244 EXT ECG>48HR<7D REV&INTERPJ: CPT

## 2021-03-01 PROCEDURE — 99072 ADDL SUPL MATRL&STAF TM PHE: CPT

## 2021-06-11 ENCOUNTER — TRANSCRIPTION ENCOUNTER (OUTPATIENT)
Age: 21
End: 2021-06-11

## 2022-01-01 NOTE — PRE-ANESTHESIA EVALUATION ADULT - NSANTHOSAYNRD_GEN_A_CORE
(2) more than 100 beats/min
No. HERMINIA screening performed.  STOP BANG Legend: 0-2 = LOW Risk; 3-4 = INTERMEDIATE Risk; 5-8 = HIGH Risk

## 2023-10-28 ENCOUNTER — NON-APPOINTMENT (OUTPATIENT)
Age: 23
End: 2023-10-28

## 2025-04-29 NOTE — ASU DISCHARGE PLAN (ADULT/PEDIATRIC) - DISCHARGE PLAN IS COMPLETE AND GIVEN TO PATIENT
History of Present Illness:  Lawrence Grier is a/an 52 y.o. man who follows up for unprovoked pulmonary embolism diagnosed in 12/2023. He also has a history of prior pulmonary embolism in 2013, at which time he was diagnosed with heterozygous factor V Leiden mutation. He was anticoagulated with warfarin but had difficulty staying in the therapeutic range so was transitioned to fondaparinux. He was kept on anticoagulation for about a year.    In early December 2023 started noticing dyspnea while running. Sought care and found to have pulmonary embolism. Treated as an outpatient with apixaban. After I first saw him last we got a venous duplex ultrasound (about a month after PE dx), which showed chronic changes in the left posterior tibial vein.     He continues on anticoagulation with apixaban.    He denies bleeding including epistaxis, gingival bleeding, hemoptysis, hematemesis, hematochezia, melena, and hematuria.     Since pulmonary embolism diagnosis he was found to have elevated light changes; saw hematology. Had bone survey. Findings consistent with MGUS. Also dealing with neuropathy of uncertain etiology; following with neurology.      Medical History[1]  Surgical History[2]  Social History[3]  Family History[4]  Current Medications[5]    Physical Examination:  Blood pressure 113/73, pulse 63, weight 76.7 kg (169 lb), SpO2 97%.  No distress  No JVD or carotid bruits  Lungs clear bilaterally  Heart regular and without murmurs  Abdomen soft and non-tender  No leg swelling  Pulses intact      Pertinent Labs:    Pertinent Imaging:  CTA chest 12/20/2023  IMPRESSION:  1.  This exam is positive for the presence of acute pulmonary emboli.  There is no convincing evidence for right ventricular strain.      Venous duplex ultrasound 1/18/2024   CONCLUSIONS:     Right Lower Venous: No evidence of acute deep vein thrombus visualized in the right lower extremity.     Left Lower Venous: No evidence of acute deep vein  thrombus visualized in the left lower extremity. There are chronic changes visualized in the posterior tibial vein.    Diagnoses and all orders for this visit:  Pulmonary embolism, unspecified chronicity, unspecified pulmonary embolism type, unspecified whether acute cor pulmonale present (Multi) (Primary)  Continue indefinite anticoagulation     Follow up in 6 months.    Consuelo Bearden MD, MS           [1]   Past Medical History:  Diagnosis Date    Androgenic alopecia     Factor V Leiden (Multi)     Pulmonary embolus     2 separate unprovoked PE   [2]   Past Surgical History:  Procedure Laterality Date    LUMBAR EPIDURAL INJECTION     [3]   Social History  Tobacco Use    Smoking status: Never    Smokeless tobacco: Never   Vaping Use    Vaping status: Never Used   Substance Use Topics    Alcohol use: Yes     Alcohol/week: 7.0 standard drinks of alcohol     Types: 7 Cans of beer per week     Comment: 1 beer a day.    Drug use: Never   [4]   Family History  Problem Relation Name Age of Onset    Breast cancer Mother Darleen Grier     Atrial fibrillation Father      No Known Problems Sister      No Known Problems Sister      No Known Problems Daughter          Jr in College    No Known Problems Son      No Known Problems Son          Medical school - [employer]   [5]   Current Outpatient Medications   Medication Sig Dispense Refill    albuterol 90 mcg/actuation inhaler Inhale 2 puffs every 6 hours if needed for wheezing. 18 g 1    apixaban (Eliquis) 5 mg tablet Take 1 tablet (5 mg) by mouth 2 times a day. 180 tablet 3    b complex 0.4 mg tablet Take 1 tablet by mouth once daily.      diazePAM (Valium) 10 mg tablet Take 30 min prior to flight. 24 tablet 0    finasteride (Propecia) 1 mg tablet TAKE ONE TABLET BY MOUTH ONCE DAILY 90 tablet 3    minoxidiL-finasteride 7-0.1 % solution Apply 1 Application topically 2 times a day. 30 g 11    multivitamin tablet Take 1 tablet by mouth once daily.      oxymetazoline (Afrin) 0.05 %  : Yes nasal spray Administer 2 sprays into each nostril once daily at bedtime. Do not use for more than 3 days.      tadalafil (Cialis) 2.5 mg tablet Take 1 tablet (2.5 mg) by mouth once daily.      gabapentin (Neurontin) 100 mg capsule Take 1 capsule (100 mg) by mouth once daily at bedtime for 3 days, THEN 2 capsules (200 mg) once daily at bedtime for 3 days, THEN 3 capsules (300 mg) once daily at bedtime for 24 days. 81 capsule 1     No current facility-administered medications for this visit.